# Patient Record
Sex: FEMALE | URBAN - METROPOLITAN AREA
[De-identification: names, ages, dates, MRNs, and addresses within clinical notes are randomized per-mention and may not be internally consistent; named-entity substitution may affect disease eponyms.]

---

## 2024-07-24 ENCOUNTER — OFFICE VISIT (OUTPATIENT)
Dept: URBAN - METROPOLITAN AREA CLINIC 88 | Facility: CLINIC | Age: 39
End: 2024-07-24

## 2024-08-09 ENCOUNTER — OFFICE VISIT (OUTPATIENT)
Dept: URBAN - METROPOLITAN AREA CLINIC 88 | Facility: CLINIC | Age: 39
End: 2024-08-09

## 2024-08-12 ENCOUNTER — DASHBOARD ENCOUNTERS (OUTPATIENT)
Age: 39
End: 2024-08-12

## 2024-08-12 ENCOUNTER — OFFICE VISIT (OUTPATIENT)
Dept: URBAN - METROPOLITAN AREA CLINIC 88 | Facility: CLINIC | Age: 39
End: 2024-08-12
Payer: COMMERCIAL

## 2024-08-12 VITALS
BODY MASS INDEX: 34.23 KG/M2 | HEART RATE: 60 BPM | HEIGHT: 63 IN | TEMPERATURE: 98 F | DIASTOLIC BLOOD PRESSURE: 81 MMHG | WEIGHT: 193.2 LBS | OXYGEN SATURATION: 100 % | SYSTOLIC BLOOD PRESSURE: 120 MMHG

## 2024-08-12 DIAGNOSIS — R11.0 NAUSEA: ICD-10-CM

## 2024-08-12 DIAGNOSIS — R10.84 ABDOMINAL PAIN, GENERALIZED: ICD-10-CM

## 2024-08-12 DIAGNOSIS — K59.01 SLOW TRANSIT CONSTIPATION: ICD-10-CM

## 2024-08-12 DIAGNOSIS — K21.9 GASTROESOPHAGEAL REFLUX DISEASE, UNSPECIFIED WHETHER ESOPHAGITIS PRESENT: ICD-10-CM

## 2024-08-12 PROBLEM — 235595009: Status: ACTIVE | Noted: 2024-08-12

## 2024-08-12 PROBLEM — 35298007: Status: ACTIVE | Noted: 2024-08-12

## 2024-08-12 PROCEDURE — 99204 OFFICE O/P NEW MOD 45 MIN: CPT | Performed by: NURSE PRACTITIONER

## 2024-08-12 RX ORDER — SUCRALFATE 1 G/1
TABLET ORAL
Qty: 120 TABLET | Status: ACTIVE | COMMUNITY

## 2024-08-12 RX ORDER — ESOMEPRAZOLE MAGNESIUM 40 MG/1
1 CAPSULE CAPSULE, DELAYED RELEASE PELLETS ORAL ONCE A DAY
Status: ACTIVE | COMMUNITY

## 2024-08-12 RX ORDER — AZELASTINE HYDROCHLORIDE 137 UG/1
SPRAY, METERED NASAL
Qty: 30 MILLILITER | Status: ACTIVE | COMMUNITY

## 2024-08-12 RX ORDER — ESOMEPRAZOLE MAGNESIUM 40 MG/1
1 CAPSULE CAPSULE, DELAYED RELEASE PELLETS ORAL ONCE A DAY
OUTPATIENT

## 2024-08-12 RX ORDER — TOPIRAMATE 50 MG/1
TABLET, FILM COATED ORAL
Qty: 60 TABLET | Status: ACTIVE | COMMUNITY

## 2024-08-12 RX ORDER — SUCRALFATE 1 G/1
TABLET ORAL
OUTPATIENT

## 2024-08-12 RX ORDER — FERROUS SULFATE 325(65) MG
1 TABLET TABLET ORAL
Status: ACTIVE | COMMUNITY

## 2024-08-12 RX ORDER — MONTELUKAST 10 MG/1
TABLET, FILM COATED ORAL
Qty: 30 TABLET | Status: ACTIVE | COMMUNITY

## 2024-08-12 RX ORDER — POLYETHYLENE GLYCOL 3350 17 G/DOSE
MIX ONE SCOOP IN AT LEAST 8 OZ OF WATER, TEA, COFFEE, JUICE POWDER (GRAM) ORAL
Qty: 510 GRAMS | Refills: 5 | OUTPATIENT
Start: 2024-08-12 | End: 2025-02-07

## 2024-08-12 NOTE — HPI-TODAY'S VISIT:
38 y.o. referred by Dr. Chilo Craig for evaluation and management of upper abdominal pain and iron deficiency anemia.  Patient states she was experiencing upper abdominal pain that started a month ago.  Her pain was primarily to the epigastric suddenly.  Described it as intermittent sharp pain.  Voices similar pain a few months prior to this that improved after taking Pepto-Bismol.   Unable to identify specific triggers.  Associated symptoms:  nausea w/o vomiting, abdominal bloating, belching, hot flashes.  Symptoms would occur with and without eating.   Was evaluated by PCP a month ago who prescribed esomeprazole and Carfare.  At this time, rates her pain a 0 out of 10.   Denies excessive use of NSAIDs.  Takes Excedrin migraine medication as needed. Voices a long hx of LUZ and takes iron tablets once a day.  This has lead to constipation, defecation occurs every 2 days.  Takes Metamucil daily but fails to experience a complete sense of evacuation of stool.   Labs on 07/18/2024:  Hgb 12.5, MCV 78.8,  and normal LFTs.  Was informed of FOBT being negative a month ago.   Denies family hx of GI malignancies or prior EGD procedure.

## 2024-09-23 ENCOUNTER — OFFICE VISIT (OUTPATIENT)
Dept: URBAN - METROPOLITAN AREA CLINIC 88 | Facility: CLINIC | Age: 39
End: 2024-09-23

## 2024-09-23 RX ORDER — MONTELUKAST 10 MG/1
TABLET, FILM COATED ORAL
Qty: 30 TABLET | Status: ACTIVE | COMMUNITY

## 2024-09-23 RX ORDER — SUCRALFATE 1 G/1
TABLET ORAL
Status: ACTIVE | COMMUNITY

## 2024-09-23 RX ORDER — TOPIRAMATE 50 MG/1
TABLET, FILM COATED ORAL
Qty: 60 TABLET | Status: ACTIVE | COMMUNITY

## 2024-09-23 RX ORDER — AZELASTINE HYDROCHLORIDE 137 UG/1
SPRAY, METERED NASAL
Qty: 30 MILLILITER | Status: ACTIVE | COMMUNITY

## 2024-09-23 RX ORDER — FERROUS SULFATE 325(65) MG
1 TABLET TABLET ORAL
Status: ACTIVE | COMMUNITY

## 2024-09-23 RX ORDER — POLYETHYLENE GLYCOL 3350 17 G/DOSE
MIX ONE SCOOP IN AT LEAST 8 OZ OF WATER, TEA, COFFEE, JUICE POWDER (GRAM) ORAL
Qty: 510 GRAMS | Refills: 5 | Status: ACTIVE | COMMUNITY
Start: 2024-08-12 | End: 2025-02-07

## 2024-09-23 RX ORDER — ESOMEPRAZOLE MAGNESIUM 40 MG/1
1 CAPSULE CAPSULE, DELAYED RELEASE PELLETS ORAL ONCE A DAY
Status: ACTIVE | COMMUNITY

## 2024-10-04 ENCOUNTER — OFFICE VISIT (OUTPATIENT)
Dept: URBAN - METROPOLITAN AREA CLINIC 88 | Facility: CLINIC | Age: 39
End: 2024-10-04

## 2024-10-04 RX ORDER — AZELASTINE HYDROCHLORIDE 137 UG/1
SPRAY, METERED NASAL
Qty: 30 MILLILITER | Status: ACTIVE | COMMUNITY

## 2024-10-04 RX ORDER — SUCRALFATE 1 G/1
TABLET ORAL
Status: ACTIVE | COMMUNITY

## 2024-10-04 RX ORDER — POLYETHYLENE GLYCOL 3350 17 G/DOSE
MIX ONE SCOOP IN AT LEAST 8 OZ OF WATER, TEA, COFFEE, JUICE POWDER (GRAM) ORAL
Qty: 510 GRAMS | Refills: 5 | Status: ACTIVE | COMMUNITY
Start: 2024-08-12 | End: 2025-02-07

## 2024-10-04 RX ORDER — FERROUS SULFATE 325(65) MG
1 TABLET TABLET ORAL
Status: ACTIVE | COMMUNITY

## 2024-10-04 RX ORDER — MONTELUKAST 10 MG/1
TABLET, FILM COATED ORAL
Qty: 30 TABLET | Status: ACTIVE | COMMUNITY

## 2024-10-04 RX ORDER — TOPIRAMATE 50 MG/1
TABLET, FILM COATED ORAL
Qty: 60 TABLET | Status: ACTIVE | COMMUNITY

## 2024-10-04 RX ORDER — ESOMEPRAZOLE MAGNESIUM 40 MG/1
1 CAPSULE CAPSULE, DELAYED RELEASE PELLETS ORAL ONCE A DAY
Status: ACTIVE | COMMUNITY

## 2024-10-18 ENCOUNTER — OFFICE VISIT (OUTPATIENT)
Dept: URBAN - METROPOLITAN AREA CLINIC 88 | Facility: CLINIC | Age: 39
End: 2024-10-18
Payer: COMMERCIAL

## 2024-10-18 VITALS
DIASTOLIC BLOOD PRESSURE: 81 MMHG | OXYGEN SATURATION: 100 % | HEART RATE: 64 BPM | WEIGHT: 190 LBS | TEMPERATURE: 97.2 F | BODY MASS INDEX: 33.66 KG/M2 | HEIGHT: 63 IN | SYSTOLIC BLOOD PRESSURE: 117 MMHG

## 2024-10-18 DIAGNOSIS — R11.0 NAUSEA: ICD-10-CM

## 2024-10-18 DIAGNOSIS — K21.9 GASTROESOPHAGEAL REFLUX DISEASE, UNSPECIFIED WHETHER ESOPHAGITIS PRESENT: ICD-10-CM

## 2024-10-18 DIAGNOSIS — R10.10 UPPER ABDOMINAL PAIN: ICD-10-CM

## 2024-10-18 DIAGNOSIS — K59.01 SLOW TRANSIT CONSTIPATION: ICD-10-CM

## 2024-10-18 PROCEDURE — 99213 OFFICE O/P EST LOW 20 MIN: CPT | Performed by: NURSE PRACTITIONER

## 2024-10-18 RX ORDER — SUCRALFATE 1 G/1
TABLET ORAL
Status: ACTIVE | COMMUNITY

## 2024-10-18 RX ORDER — TOPIRAMATE 50 MG/1
TABLET, FILM COATED ORAL
Qty: 60 TABLET | Status: ACTIVE | COMMUNITY

## 2024-10-18 RX ORDER — ESOMEPRAZOLE MAGNESIUM 40 MG/1
1 CAPSULE CAPSULE, DELAYED RELEASE PELLETS ORAL ONCE A DAY
Status: ACTIVE | COMMUNITY

## 2024-10-18 RX ORDER — MONTELUKAST 10 MG/1
TABLET, FILM COATED ORAL
Qty: 30 TABLET | Status: ACTIVE | COMMUNITY

## 2024-10-18 RX ORDER — SUCRALFATE 1 G/1
TABLET ORAL
OUTPATIENT

## 2024-10-18 RX ORDER — AZELASTINE HYDROCHLORIDE 137 UG/1
SPRAY, METERED NASAL
Qty: 30 MILLILITER | Status: ACTIVE | COMMUNITY

## 2024-10-18 RX ORDER — FERROUS SULFATE 325(65) MG
1 TABLET TABLET ORAL
Status: ACTIVE | COMMUNITY

## 2024-10-18 RX ORDER — POLYETHYLENE GLYCOL 3350 17 G/DOSE
MIX ONE SCOOP IN AT LEAST 8 OZ OF WATER, TEA, COFFEE, JUICE POWDER (GRAM) ORAL
Qty: 510 GRAMS | Refills: 5 | Status: ACTIVE | COMMUNITY
Start: 2024-08-12 | End: 2025-02-07

## 2024-10-18 RX ORDER — ESOMEPRAZOLE MAGNESIUM 40 MG/1
1 CAPSULE CAPSULE, DELAYED RELEASE PELLETS ORAL ONCE A DAY
OUTPATIENT

## 2024-10-18 NOTE — HPI-OTHER HISTORIES
- - - - - - - - - - - - - - - - - - - - - - - - - - - Office note 08/12/2024: 38 y.o. referred by Dr. Chilo Craig for evaluation and management of upper abdominal pain and iron deficiency anemia. Patient states she was experiencing upper abdominal pain that started a month ago. Her pain was primarily to the epigastric suddenly. Described it as intermittent sharp pain. Voices similar pain a few months prior to this that improved after taking Pepto-Bismol. Unable to identify specific triggers. Associated symptoms: nausea w/o vomiting, abdominal bloating, belching, hot flashes. Symptoms would occur with and without eating. Was evaluated by PCP a month ago who prescribed esomeprazole and Carfare. At this time, rates her pain a 0 out of 10. Denies excessive use of NSAIDs. Takes Excedrin migraine medication as needed. Voices a long hx of LUZ and takes iron tablets once a day. This has lead to constipation, defecation occurs every 2 days. Takes Metamucil daily but fails to experience a complete sense of evacuation of stool. Labs on 07/18/2024: Hgb 12.5, MCV 78.8,  and normal LFTs. Was informed of FOBT being negative a month ago. Denies family hx of GI malignancies or prior EGD procedure.

## 2024-10-18 NOTE — HPI-TODAY'S VISIT:
Patient presents today for follow up regarding upper abdominal pain.  She voices 2 episodes of upper abdominal pain since LOV that lasted less than an hour.  Feels taking Carafate and esomeprazole as needed manages her symptoms well. Constipation has also improved with use of Metamcuil.  Overall, she is without significant complaints.  Denies prior EGD procedure.

## 2025-01-17 ENCOUNTER — OFFICE VISIT (OUTPATIENT)
Dept: URBAN - METROPOLITAN AREA CLINIC 88 | Facility: CLINIC | Age: 40
End: 2025-01-17

## 2025-01-17 RX ORDER — ESOMEPRAZOLE MAGNESIUM 40 MG/1
1 CAPSULE CAPSULE, DELAYED RELEASE PELLETS ORAL ONCE A DAY
Status: ACTIVE | COMMUNITY

## 2025-01-17 RX ORDER — SUCRALFATE 1 G/1
TABLET ORAL
Status: ACTIVE | COMMUNITY

## 2025-01-17 RX ORDER — MONTELUKAST 10 MG/1
TABLET, FILM COATED ORAL
Qty: 30 TABLET | Status: ACTIVE | COMMUNITY

## 2025-01-17 RX ORDER — TOPIRAMATE 50 MG/1
TABLET, FILM COATED ORAL
Qty: 60 TABLET | Status: ACTIVE | COMMUNITY

## 2025-01-17 RX ORDER — POLYETHYLENE GLYCOL 3350 17 G/DOSE
MIX ONE SCOOP IN AT LEAST 8 OZ OF WATER, TEA, COFFEE, JUICE POWDER (GRAM) ORAL
Qty: 510 GRAMS | Refills: 5 | Status: ACTIVE | COMMUNITY
Start: 2024-08-12 | End: 2025-02-07

## 2025-01-17 RX ORDER — FERROUS SULFATE 325(65) MG
1 TABLET TABLET ORAL
Status: ACTIVE | COMMUNITY

## 2025-01-17 RX ORDER — AZELASTINE HYDROCHLORIDE 137 UG/1
SPRAY, METERED NASAL
Qty: 30 MILLILITER | Status: ACTIVE | COMMUNITY

## 2025-06-11 ENCOUNTER — LAB OUTSIDE AN ENCOUNTER (OUTPATIENT)
Dept: URBAN - METROPOLITAN AREA CLINIC 88 | Facility: CLINIC | Age: 40
End: 2025-06-11

## 2025-06-11 ENCOUNTER — OFFICE VISIT (OUTPATIENT)
Dept: URBAN - METROPOLITAN AREA CLINIC 88 | Facility: CLINIC | Age: 40
End: 2025-06-11
Payer: COMMERCIAL

## 2025-06-11 DIAGNOSIS — K21.9 GASTROESOPHAGEAL REFLUX DISEASE, UNSPECIFIED WHETHER ESOPHAGITIS PRESENT: ICD-10-CM

## 2025-06-11 DIAGNOSIS — R11.0 NAUSEA: ICD-10-CM

## 2025-06-11 DIAGNOSIS — R10.10 UPPER ABDOMINAL PAIN: ICD-10-CM

## 2025-06-11 DIAGNOSIS — K59.04 CHRONIC IDIOPATHIC CONSTIPATION: ICD-10-CM

## 2025-06-11 PROBLEM — 82934008: Status: ACTIVE | Noted: 2025-06-11

## 2025-06-11 PROCEDURE — 99214 OFFICE O/P EST MOD 30 MIN: CPT | Performed by: NURSE PRACTITIONER

## 2025-06-11 RX ORDER — ESOMEPRAZOLE MAGNESIUM 40 MG/1
1 CAPSULE CAPSULE, DELAYED RELEASE PELLETS ORAL ONCE A DAY
OUTPATIENT
Start: 2025-06-11

## 2025-06-11 RX ORDER — TOPIRAMATE 50 MG/1
TABLET, FILM COATED ORAL
Qty: 60 TABLET | Status: ACTIVE | COMMUNITY

## 2025-06-11 RX ORDER — MONTELUKAST 10 MG/1
TABLET, FILM COATED ORAL
Qty: 30 TABLET | Status: ACTIVE | COMMUNITY

## 2025-06-11 RX ORDER — SUCRALFATE 1 G/1
TABLET ORAL
OUTPATIENT
Start: 2025-06-11

## 2025-06-11 RX ORDER — ESOMEPRAZOLE MAGNESIUM 40 MG/1
1 CAPSULE CAPSULE, DELAYED RELEASE PELLETS ORAL ONCE A DAY
Status: ACTIVE | COMMUNITY

## 2025-06-11 RX ORDER — SUCRALFATE 1 G/1
TABLET ORAL
Status: ACTIVE | COMMUNITY

## 2025-06-11 RX ORDER — AZELASTINE HYDROCHLORIDE 137 UG/1
SPRAY, METERED NASAL
Qty: 30 MILLILITER | Status: ACTIVE | COMMUNITY

## 2025-06-11 RX ORDER — FERROUS SULFATE 325(65) MG
1 TABLET TABLET ORAL
Status: ACTIVE | COMMUNITY

## 2025-06-11 NOTE — HPI-OTHER HISTORIES
- - - - - - - - - - - - - - - - - - - - - - - - - - - Ofifce note 10/18/2024: Patient presents today for follow up regarding upper abdominal pain. She voices 2 episodes of upper abdominal pain since LOV that lasted less than an hour. Feels taking Carafate and esomeprazole as needed manages her symptoms well. Constipation has also improved with use of Metamcuil.  Overall, she is without significant complaints. Denies prior EGD procedure. Patient presents today for follow up regarding upper abdominal pain. She voices 2 episodes of upper abdominal pain since LOV that lasted less than an hour. Feels taking Carafate and esomeprazole as needed manages her symptoms well. Constipation has also improved with use of Metamcuil.  Overall, she is without significant complaints. Denies prior EGD procedure.  __________________________________________ Office note 08/12/2024: 38 y.o. referred by Dr. Chilo Craig for evaluation and management of upper abdominal pain and iron deficiency anemia. Patient states she was experiencing upper abdominal pain that started a month ago. Her pain was primarily to the epigastric suddenly. Described it as intermittent sharp pain. Voices similar pain a few months prior to this that improved after taking Pepto-Bismol. Unable to identify specific triggers. Associated symptoms: nausea w/o vomiting, abdominal bloating, belching, hot flashes. Symptoms would occur with and without eating. Was evaluated by PCP a month ago who prescribed esomeprazole and Carfare. At this time, rates her pain a 0 out of 10. Denies excessive use of NSAIDs. Takes Excedrin migraine medication as needed. Voices a long hx of LUZ and takes iron tablets once a day. This has lead to constipation, defecation occurs every 2 days. Takes Metamucil daily but fails to experience a complete sense of evacuation of stool. Labs on 07/18/2024: Hgb 12.5, MCV 78.8,  and normal LFTs. Was informed of FOBT being negative a month ago. Denies family hx of GI malignancies or prior EGD procedure.

## 2025-06-11 NOTE — HPI-TODAY'S VISIT:
Presents today for evaluation of return in upper abdominal pain that started a month ago.  Pain is described as a constant burning sensation that has increased over the last month. Associated symptoms:  excessive belching, bloating, nausea w/o vomiting.  Reports observing dark stools a month ago.  Started iron tablets over a year ago.   Aggravating factors:  unable to identify specific triggers.  Has eliminated spicy and acidic foods and tried monitoring her dietary intake to avoid symptoms. Alleviating measures:  Remains on esomeprazole for over a year.  PCP instructed patient to resume nightly use of famotidine a month ago with no improvement in symptoms.    Takes Excedrin few times a month for migraine HA.  Denies use of NSAIDs.    Reports normal labs by PCP a month ago.   Denies prior EGD.  Constiopation:  Currently occurs every 2-3 days with stools similar to type 1.  Intially improved with use of Metamucil.  Depsite daily use, constipation has returned.  She has also tried and failed use of Miralax, OTC laxatives and herbal regimens.

## 2025-06-13 ENCOUNTER — CLAIMS CREATED FROM THE CLAIM WINDOW (OUTPATIENT)
Dept: URBAN - METROPOLITAN AREA SURGERY CENTER 24 | Facility: SURGERY CENTER | Age: 40
End: 2025-06-13
Payer: COMMERCIAL

## 2025-06-13 ENCOUNTER — CLAIMS CREATED FROM THE CLAIM WINDOW (OUTPATIENT)
Dept: URBAN - METROPOLITAN AREA CLINIC 4 | Facility: CLINIC | Age: 40
End: 2025-06-13
Payer: COMMERCIAL

## 2025-06-13 DIAGNOSIS — K31.89 REACTIVE GASTROPATHY: ICD-10-CM

## 2025-06-13 DIAGNOSIS — K29.70 GASTRITIS, UNSPECIFIED, WITHOUT BLEEDING: ICD-10-CM

## 2025-06-13 DIAGNOSIS — K29.60 OTHER GASTRITIS WITHOUT HEMORRHAGE, UNSPECIFIED CHRONICITY: ICD-10-CM

## 2025-06-13 DIAGNOSIS — R10.13 EPIGASTRIC ABDOMINAL PAIN: ICD-10-CM

## 2025-06-13 DIAGNOSIS — R11.0 NAUSEA: ICD-10-CM

## 2025-06-13 PROCEDURE — 43239 EGD BIOPSY SINGLE/MULTIPLE: CPT | Performed by: INTERNAL MEDICINE

## 2025-06-13 PROCEDURE — 88305 TISSUE EXAM BY PATHOLOGIST: CPT | Performed by: PATHOLOGY

## 2025-06-13 PROCEDURE — 88342 IMHCHEM/IMCYTCHM 1ST ANTB: CPT | Performed by: PATHOLOGY

## 2025-06-13 PROCEDURE — 00731 ANES UPR GI NDSC PX NOS: CPT | Performed by: NURSE ANESTHETIST, CERTIFIED REGISTERED

## 2025-06-13 RX ORDER — TOPIRAMATE 50 MG/1
TABLET, FILM COATED ORAL
Qty: 60 TABLET | Status: ACTIVE | COMMUNITY

## 2025-06-13 RX ORDER — FERROUS SULFATE 325(65) MG
1 TABLET TABLET ORAL
Status: ACTIVE | COMMUNITY

## 2025-06-13 RX ORDER — AZELASTINE HYDROCHLORIDE 137 UG/1
SPRAY, METERED NASAL
Qty: 30 MILLILITER | Status: ACTIVE | COMMUNITY

## 2025-06-13 RX ORDER — ESOMEPRAZOLE MAGNESIUM 40 MG/1
1 CAPSULE CAPSULE, DELAYED RELEASE PELLETS ORAL ONCE A DAY
Status: ACTIVE | COMMUNITY
Start: 2025-06-11

## 2025-06-13 RX ORDER — MONTELUKAST 10 MG/1
TABLET, FILM COATED ORAL
Qty: 30 TABLET | Status: ACTIVE | COMMUNITY

## 2025-06-13 RX ORDER — SUCRALFATE 1 G/1
TABLET ORAL
Status: ACTIVE | COMMUNITY
Start: 2025-06-11